# Patient Record
Sex: FEMALE | Race: WHITE | NOT HISPANIC OR LATINO | URBAN - METROPOLITAN AREA
[De-identification: names, ages, dates, MRNs, and addresses within clinical notes are randomized per-mention and may not be internally consistent; named-entity substitution may affect disease eponyms.]

---

## 2024-01-19 ENCOUNTER — APPOINTMENT (RX ONLY)
Dept: URBAN - METROPOLITAN AREA CLINIC 161 | Facility: CLINIC | Age: 34
Setting detail: DERMATOLOGY
End: 2024-01-19

## 2024-01-19 DIAGNOSIS — Z41.9 ENCOUNTER FOR PROCEDURE FOR PURPOSES OTHER THAN REMEDYING HEALTH STATE, UNSPECIFIED: ICD-10-CM

## 2024-01-19 PROCEDURE — ? COSMETIC CONSULTATION: AGING FACE

## 2024-02-19 ENCOUNTER — APPOINTMENT (RX ONLY)
Dept: URBAN - METROPOLITAN AREA CLINIC 159 | Facility: CLINIC | Age: 34
Setting detail: DERMATOLOGY
End: 2024-02-19

## 2024-02-19 DIAGNOSIS — Z41.9 ENCOUNTER FOR PROCEDURE FOR PURPOSES OTHER THAN REMEDYING HEALTH STATE, UNSPECIFIED: ICD-10-CM

## 2024-02-19 PROCEDURE — ? SCITON MOXI

## 2024-02-19 PROCEDURE — ? FOREVER CLEAR BBL

## 2024-02-19 ASSESSMENT — LOCATION SIMPLE DESCRIPTION DERM
LOCATION SIMPLE: LEFT CHEEK
LOCATION SIMPLE: RIGHT FOREHEAD
LOCATION SIMPLE: RIGHT CHEEK

## 2024-02-19 ASSESSMENT — LOCATION DETAILED DESCRIPTION DERM
LOCATION DETAILED: LEFT INFERIOR CENTRAL MALAR CHEEK
LOCATION DETAILED: RIGHT INFERIOR FOREHEAD
LOCATION DETAILED: RIGHT INFERIOR MEDIAL MALAR CHEEK

## 2024-02-19 ASSESSMENT — LOCATION ZONE DERM: LOCATION ZONE: FACE

## 2024-02-19 NOTE — PROCEDURE: SCITON MOXI
Detail Level: Detailed
Price (Use Numbers Only, No Special Characters Or $): 023
Topical Anesthesia?: 23% lidocaine, 7% tetracaine
Length Of Topical Anesthesia Application (Optional): 30 minutes
Treatment Number: 1
External Cooling Fan Speed: 5
Laser Type: Fractionated 1927nm, thulium laser
Level 3
Pre=procedure Text: After consent was obtained, the treatment areas were cleaned and treated using the parameters noted above.
Consent: Written consent obtained.  Risks were reviewed including but not limited to crusting, scabbing, blistering, scarring, darker or lighter pigmentary change, incomplete improvement, prolonged erythema and facial swelling, infection, and bleeding.
Post-Care Instructions: I reviewed with the patient in detail post-care instructions.  Recommended diligent sun protection and sun avoidance.

## 2024-02-19 NOTE — PROCEDURE: FOREVER CLEAR BBL
Detail Level: Simple
Price (Use Numbers Only, No Special Characters Or $): 500
Number Of Prepaid Treatments (Will Not Render If 0): 0
Skin Type: III
Treatment Number: 1
Filter: 420nm Filter
Spot Size: Finesse Adapter Size: 15 x 15 mm square
Pulse Width Units: milliseconds
Fluence (J/Cm2) - Will Not Render If 0: 6
Temp (C): 15
Total Pulses (Optional): 190
Add Step 2?: yes
Passes (Optional): 2
Filter: 560nm Filter
Fluence (J/Cm2) - Will Not Render If 0: 13
Pulse Width - Will Not Render If 0: 25
Use Skintyte Settings?: no
Filter: 515nm Filter
Fluence (J/Cm2) - Will Not Render If 0: 14
Temp (C): 20
Anesthesia Type: 1% lidocaine with epinephrine
Consent: Written consent obtained.  Risks reviewed including but not limited to crusting, scabbing, blistering, scarring, darker or lighter pigmentary change, and incomplete clearance.
Procedure Note: After applying gel and applying protective eyeware, treatment was administered using the setting parameters listed above.
Post-Care Instructions: I reviewed with the patient in detail post-care instructions. Patient should avoid sun exposure before and after treatment.  Patient should wear sunscreen.